# Patient Record
Sex: MALE | Race: BLACK OR AFRICAN AMERICAN | NOT HISPANIC OR LATINO | Employment: UNEMPLOYED | URBAN - METROPOLITAN AREA
[De-identification: names, ages, dates, MRNs, and addresses within clinical notes are randomized per-mention and may not be internally consistent; named-entity substitution may affect disease eponyms.]

---

## 2019-09-19 ENCOUNTER — APPOINTMENT (EMERGENCY)
Dept: RADIOLOGY | Facility: HOSPITAL | Age: 34
End: 2019-09-19
Payer: COMMERCIAL

## 2019-09-19 ENCOUNTER — HOSPITAL ENCOUNTER (EMERGENCY)
Facility: HOSPITAL | Age: 34
Discharge: HOME | End: 2019-09-19
Attending: EMERGENCY MEDICINE
Payer: COMMERCIAL

## 2019-09-19 VITALS
RESPIRATION RATE: 16 BRPM | HEART RATE: 81 BPM | BODY MASS INDEX: 29.47 KG/M2 | DIASTOLIC BLOOD PRESSURE: 78 MMHG | TEMPERATURE: 97.5 F | OXYGEN SATURATION: 100 % | WEIGHT: 194.45 LBS | SYSTOLIC BLOOD PRESSURE: 142 MMHG | HEIGHT: 68 IN

## 2019-09-19 DIAGNOSIS — R07.89 CHEST WALL PAIN: Primary | ICD-10-CM

## 2019-09-19 LAB
ALBUMIN SERPL BCP-MCNC: 4.3 G/DL (ref 3–5.2)
ALP SERPL-CCNC: 58 U/L (ref 43–122)
ALT SERPL W P-5'-P-CCNC: 35 U/L (ref 9–52)
ANION GAP SERPL CALCULATED.3IONS-SCNC: 4 MMOL/L (ref 5–14)
AST SERPL W P-5'-P-CCNC: 29 U/L (ref 17–59)
ATRIAL RATE: 96 BPM
BASOPHILS # BLD AUTO: 0 THOUSANDS/ΜL (ref 0–0.1)
BASOPHILS NFR BLD AUTO: 1 % (ref 0–1)
BILIRUB SERPL-MCNC: 0.3 MG/DL
BUN SERPL-MCNC: 16 MG/DL (ref 5–25)
CALCIUM SERPL-MCNC: 9.6 MG/DL (ref 8.4–10.2)
CHLORIDE SERPL-SCNC: 99 MMOL/L (ref 97–108)
CO2 SERPL-SCNC: 35 MMOL/L (ref 22–30)
CREAT SERPL-MCNC: 0.84 MG/DL (ref 0.7–1.5)
EOSINOPHIL # BLD AUTO: 0.2 THOUSAND/ΜL (ref 0–0.4)
EOSINOPHIL NFR BLD AUTO: 4 % (ref 0–6)
ERYTHROCYTE [DISTWIDTH] IN BLOOD BY AUTOMATED COUNT: 14.2 %
GFR SERPL CREATININE-BSD FRML MDRD: 134 ML/MIN/1.73SQ M
GLUCOSE SERPL-MCNC: 99 MG/DL (ref 70–99)
HCT VFR BLD AUTO: 44.2 % (ref 41–53)
HGB BLD-MCNC: 14.8 G/DL (ref 13.5–17.5)
LIPASE SERPL-CCNC: 42 U/L (ref 23–300)
LYMPHOCYTES # BLD AUTO: 1.5 THOUSANDS/ΜL (ref 0.5–4)
LYMPHOCYTES NFR BLD AUTO: 28 % (ref 25–45)
MCH RBC QN AUTO: 30.5 PG (ref 26–34)
MCHC RBC AUTO-ENTMCNC: 33.5 G/DL (ref 31–36)
MCV RBC AUTO: 91 FL (ref 80–100)
MONOCYTES # BLD AUTO: 0.6 THOUSAND/ΜL (ref 0.2–0.9)
MONOCYTES NFR BLD AUTO: 11 % (ref 1–10)
NEUTROPHILS # BLD AUTO: 3 THOUSANDS/ΜL (ref 1.8–7.8)
NEUTS SEG NFR BLD AUTO: 57 % (ref 45–65)
P AXIS: 52 DEGREES
PLATELET # BLD AUTO: 361 THOUSANDS/UL (ref 150–450)
PMV BLD AUTO: 7.7 FL (ref 8.9–12.7)
POTASSIUM SERPL-SCNC: 4.2 MMOL/L (ref 3.6–5)
PR INTERVAL: 160 MS
PROT SERPL-MCNC: 7.7 G/DL (ref 5.9–8.4)
QRS AXIS: 72 DEGREES
QRSD INTERVAL: 72 MS
QT INTERVAL: 328 MS
QTC INTERVAL: 414 MS
RBC # BLD AUTO: 4.85 MILLION/UL (ref 4.5–5.9)
SODIUM SERPL-SCNC: 138 MMOL/L (ref 137–147)
T WAVE AXIS: 37 DEGREES
TROPONIN I SERPL-MCNC: <0.01 NG/ML (ref 0–0.03)
VENTRICULAR RATE: 96 BPM
WBC # BLD AUTO: 5.3 THOUSAND/UL (ref 4.5–11)

## 2019-09-19 PROCEDURE — 96374 THER/PROPH/DIAG INJ IV PUSH: CPT

## 2019-09-19 PROCEDURE — 71046 X-RAY EXAM CHEST 2 VIEWS: CPT

## 2019-09-19 PROCEDURE — 84484 ASSAY OF TROPONIN QUANT: CPT | Performed by: EMERGENCY MEDICINE

## 2019-09-19 PROCEDURE — 80053 COMPREHEN METABOLIC PANEL: CPT | Performed by: EMERGENCY MEDICINE

## 2019-09-19 PROCEDURE — 93010 ELECTROCARDIOGRAM REPORT: CPT | Performed by: INTERNAL MEDICINE

## 2019-09-19 PROCEDURE — 85025 COMPLETE CBC W/AUTO DIFF WBC: CPT | Performed by: EMERGENCY MEDICINE

## 2019-09-19 PROCEDURE — 36415 COLL VENOUS BLD VENIPUNCTURE: CPT | Performed by: EMERGENCY MEDICINE

## 2019-09-19 PROCEDURE — 99285 EMERGENCY DEPT VISIT HI MDM: CPT

## 2019-09-19 PROCEDURE — 93005 ELECTROCARDIOGRAM TRACING: CPT

## 2019-09-19 PROCEDURE — 99285 EMERGENCY DEPT VISIT HI MDM: CPT | Performed by: EMERGENCY MEDICINE

## 2019-09-19 PROCEDURE — 83690 ASSAY OF LIPASE: CPT | Performed by: EMERGENCY MEDICINE

## 2019-09-19 RX ORDER — KETOROLAC TROMETHAMINE 30 MG/ML
15 INJECTION, SOLUTION INTRAMUSCULAR; INTRAVENOUS ONCE
Status: COMPLETED | OUTPATIENT
Start: 2019-09-19 | End: 2019-09-19

## 2019-09-19 RX ADMIN — KETOROLAC TROMETHAMINE 15 MG: 30 INJECTION, SOLUTION INTRAMUSCULAR at 11:37

## 2019-09-19 NOTE — ED PROVIDER NOTES
History  Chief Complaint   Patient presents with    Chest Pain     less than 1 week "feels like a knot" accompanied with a cough     81-year-old male presents for evaluation of chest wall pain that is been ongoing for the last week  Patient states he feels a knot under his sternum and is tender to palpation  Patient also reports pain across bilateral ribs  Denies any specific inciting factors  No associated nausea, vomiting or radiation of pain  No pain medications prior to arrival   No known family history of cardiac disease  Denies fever, cough or shortness of breath  None       History reviewed  No pertinent past medical history  History reviewed  No pertinent surgical history  History reviewed  No pertinent family history  I have reviewed and agree with the history as documented  Social History     Tobacco Use    Smoking status: Current Every Day Smoker     Packs/day: 0 50    Smokeless tobacco: Never Used   Substance Use Topics    Alcohol use: Not Currently    Drug use: Not Currently     Types: Marijuana     Comment: percocets - 2 months ago        Review of Systems   Constitutional: Negative for chills, diaphoresis and fever  HENT: Negative for congestion and rhinorrhea  Eyes: Negative for pain and visual disturbance  Respiratory: Negative for cough, shortness of breath and wheezing  Cardiovascular: Positive for chest pain  Negative for leg swelling  Gastrointestinal: Negative for abdominal pain, diarrhea, nausea and vomiting  Genitourinary: Negative for difficulty urinating, dysuria, frequency and urgency  Musculoskeletal: Negative for back pain and neck pain  Skin: Negative for color change and rash  Neurological: Negative for syncope, numbness and headaches  All other systems reviewed and are negative  Physical Exam  Physical Exam   Constitutional: He is oriented to person, place, and time  He appears well-developed and well-nourished     HENT:   Head: Normocephalic and atraumatic  Eyes: Conjunctivae and EOM are normal    Neck: Normal range of motion  Neck supple  Cardiovascular: Normal rate and regular rhythm  Pulmonary/Chest: Effort normal and breath sounds normal  No respiratory distress  He has no wheezes  He has no rales  Abdominal: Soft  Bowel sounds are normal  There is no tenderness  There is no guarding  Musculoskeletal: Normal range of motion  He exhibits tenderness  He exhibits no edema  The "mass/knot" under his sternum is his xiphoid process  Tender to palpation  Neurological: He is alert and oriented to person, place, and time  No cranial nerve deficit  Skin: Skin is warm  No erythema  Psychiatric: He has a normal mood and affect  His behavior is normal    Nursing note and vitals reviewed        Vital Signs  ED Triage Vitals   Temperature Pulse Respirations Blood Pressure SpO2   09/19/19 0948 09/19/19 0948 09/19/19 0948 09/19/19 0948 09/19/19 0948   97 5 °F (36 4 °C) 94 18 148/98 100 %      Temp src Heart Rate Source Patient Position - Orthostatic VS BP Location FiO2 (%)   -- 09/19/19 1141 09/19/19 1141 09/19/19 1141 --    Monitor Lying Right arm       Pain Score       09/19/19 0948       7           Vitals:    09/19/19 0948 09/19/19 1141   BP: 148/98 142/78   Pulse: 94 81   Patient Position - Orthostatic VS:  Lying         Visual Acuity      ED Medications  Medications   ketorolac (TORADOL) injection 15 mg (15 mg Intravenous Given 9/19/19 1137)       Diagnostic Studies  Results Reviewed     Procedure Component Value Units Date/Time    Lipase [[id number]]  (Normal) Collected:  09/19/19 1042    Lab Status:  Final result Specimen:  Blood from Arm, Left Updated:  09/19/19 1133     Lipase 42 u/L     Troponin I [[id number]]  (Normal) Collected:  09/19/19 1042    Lab Status:  Final result Specimen:  Blood from Arm, Left Updated:  09/19/19 1114     Troponin I <0 01 ng/mL     Comprehensive metabolic panel [[id number]]  (Abnormal) Collected: 09/19/19 1042    Lab Status:  Final result Specimen:  Blood from Arm, Left Updated:  09/19/19 1108     Sodium 138 mmol/L      Potassium 4 2 mmol/L      Chloride 99 mmol/L      CO2 35 mmol/L      ANION GAP 4 mmol/L      BUN 16 mg/dL      Creatinine 0 84 mg/dL      Glucose 99 mg/dL      Calcium 9 6 mg/dL      AST 29 U/L      ALT 35 U/L      Alkaline Phosphatase 58 U/L      Total Protein 7 7 g/dL      Albumin 4 3 g/dL      Total Bilirubin 0 30 mg/dL      eGFR 134 ml/min/1 73sq m     Narrative:       [facility] guidelines for Chronic Kidney Disease (CKD):     Stage 1 with normal or high GFR (GFR > 90 mL/min/1 73 square meters)    Stage 2 Mild CKD (GFR = 60-89 mL/min/1 73 square meters)    Stage 3A Moderate CKD (GFR = 45-59 mL/min/1 73 square meters)    Stage 3B Moderate CKD (GFR = 30-44 mL/min/1 73 square meters)    Stage 4 Severe CKD (GFR = 15-29 mL/min/1 73 square meters)    Stage 5 End Stage CKD (GFR <15 mL/min/1 73 square meters)  Note: GFR calculation is accurate only with a steady state creatinine    CBC and differential [[id number]]  (Abnormal) Collected:  09/19/19 1042    Lab Status:  Final result Specimen:  Blood from Arm, Left Updated:  09/19/19 1055     WBC 5 30 Thousand/uL      RBC 4 85 Million/uL      Hemoglobin 14 8 g/dL      Hematocrit 44 2 %      MCV 91 fL      MCH 30 5 pg      MCHC 33 5 g/dL      RDW 14 2 %      MPV 7 7 fL      Platelets 158 Thousands/uL      Neutrophils Relative 57 %      Lymphocytes Relative 28 %      Monocytes Relative 11 %      Eosinophils Relative 4 %      Basophils Relative 1 %      Neutrophils Absolute 3 00 Thousands/µL      Lymphocytes Absolute 1 50 Thousands/µL      Monocytes Absolute 0 60 Thousand/µL      Eosinophils Absolute 0 20 Thousand/µL      Basophils Absolute 0 00 Thousands/µL                  XR chest 2 views   ED Interpretation by Bouchra Brooks DO (09/19 1148)   No acute pulm disease      Final Result by Joann Petit MD (09/19 8651) No acute cardiopulmonary disease  Workstation performed: [id number]                    Procedures  Procedures       ED Course  ED Course as of Sep 19 1556   Thu Sep 19, 2019   1055 Procedure Note: EKG  Date/Time: 09/19/19 10:56 AM   Performed by: Ruthy Goel  Authorized by: Ruthy Goel  ECG interpreted by me, the ED Provider: yes   The EKG demonstrates:  Rate 96  Rhythm NSR  QTc 414  No ST elevations/depressions              HEART Risk Score      Most Recent Value   History  0 Filed at: 09/19/2019 1554   ECG  0 Filed at: 09/19/2019 1554   Age  0 Filed at: 09/19/2019 1554   Risk Factors  1 Filed at: 09/19/2019 1554   Troponin  0 Filed at: 09/19/2019 1554   Heart Score Risk Calculator   History  0 Filed at: 09/19/2019 1554   ECG  0 Filed at: 09/19/2019 1554   Age  0 Filed at: 09/19/2019 1554   Risk Factors  1 Filed at: 09/19/2019 1554   Troponin  0 Filed at: 09/19/2019 1554   HEART Score  1 Filed at: 09/19/2019 1554   HEART Score  1 Filed at: 09/19/2019 1554                            MDM  Number of Diagnoses or Management Options  Chest wall pain:   Diagnosis management comments: 29year old male presenting with tender xiphoid process  Cardiac evaluation, labs, ekg, CXR unremarkable  Follow up with PCP as needed  Disposition  Final diagnoses:   Chest wall pain     Time reflects when diagnosis was documented in both MDM as applicable and the Disposition within this note     Time User Action Codes Description Comment    9/19/2019 11:49 AM Ruthy Couch Add [R07 9] Chest pain     9/19/2019 11:49 AM Ruthy Couch Remove [R07 9] Chest pain     9/19/2019 11:49 AM Ruthy Couch Add [R07 89] Chest wall pain       ED Disposition     ED Disposition Condition Date/Time Comment    Discharge Stable Thu Sep 19, 2019 11:49 AM Ania Thomas discharge to home/self care              Follow-up Information     Follow up With Specialties Details Why Contact Info Additional [address] Brittany Quinonez 37 In 3 days For reassessment [address], [address] [facility] [address]  [address], [address], [address] [place], [place], [address] [place] [address] Heart Emergency Department Emergency Medicine  If symptoms worsen [address] [address] [phone]           There are no discharge medications for this patient  No discharge procedures on file      ED Provider  Electronically Signed by           Candi Rayo DO  09/19/19 1477

## 2019-09-19 NOTE — ED NOTES
Pt changed to [email or web address]  ekg done and given to dr  For review  Pt placed on monitor for continues cardiac monitoring       Imani Trinh  09/19/19 9780